# Patient Record
Sex: FEMALE
[De-identification: names, ages, dates, MRNs, and addresses within clinical notes are randomized per-mention and may not be internally consistent; named-entity substitution may affect disease eponyms.]

---

## 2017-12-29 ENCOUNTER — HOSPITAL ENCOUNTER (OUTPATIENT)
Dept: HOSPITAL 95 - LAB SHORT | Age: 62
End: 2017-12-29
Payer: OTHER GOVERNMENT

## 2017-12-29 DIAGNOSIS — R07.0: Primary | ICD-10-CM

## 2020-07-14 ENCOUNTER — HOSPITAL ENCOUNTER (OUTPATIENT)
Dept: HOSPITAL 95 - ORSCMMR | Age: 65
LOS: 1 days | Discharge: HOME | End: 2020-07-15
Attending: ORTHOPAEDIC SURGERY
Payer: OTHER GOVERNMENT

## 2020-07-14 VITALS — BODY MASS INDEX: 34.15 KG/M2 | WEIGHT: 217.6 LBS | HEIGHT: 67.01 IN

## 2020-07-14 DIAGNOSIS — M16.12: Primary | ICD-10-CM

## 2020-07-14 DIAGNOSIS — E66.9: ICD-10-CM

## 2020-07-14 PROCEDURE — C1776 JOINT DEVICE (IMPLANTABLE): HCPCS

## 2020-07-14 PROCEDURE — 0SRB0JA REPLACEMENT OF LEFT HIP JOINT WITH SYNTHETIC SUBSTITUTE, UNCEMENTED, OPEN APPROACH: ICD-10-PCS | Performed by: ORTHOPAEDIC SURGERY

## 2020-07-14 NOTE — NUR
History, Chart, Medications and Allergies reviewed before start of
procedure. Patient confirms NPO status and agrees with scheduled surgery.
Patient reports completing Chlorhexadine shower X2 prior to admission to
hospital.

## 2020-07-14 NOTE — NUR
1630-
TIME OUT FOR SPINAL ANESTHESIA BY DR ROBERTS AT [facility] BEDSIDE. 2L O2/NC INTACT.
1632-
SPINAL ANESTHESIA COMPLETE. PULSE OX INTACT. DR ROBERTS STAYING AT BEDSIDE TO
MONITOR PATIENT.

## 2020-07-14 NOTE — NUR
PATIENT ARRIVED @1935 TO ROOM 222. PATIENT IS AWAKE AND AWARE, MOTHER IS
PRESENT WITH PATIENT. VITAL SIGNS ARE STABLE, HR HAS BEEN 48 IN PACU AND IS
STILL 48. nO CURRENT COMPLAINTS OF PAIN. PT HAD A SPINAL AND INITIALLY HAD
FEELING TO L1-L2. sHE CURRENTLY HAS FULL FEELING OVER ALL DREMATOMES AND IS
ABLE TO MOVE BOTH LEGS AND CAN DISERN PRESSURE AND LIGHT TOUCH ON BOTH LEGS
ANT AND POST. PAS AND JOSE HOSE ON BOTH LEs. PATIENT HAS BEEN RESTING WELL AND
CURRENTLY HAS 1/10 PAIN IN HER LT HIP. CALL LIGHT IN REACH. ABLE TO START WITH
CLEAR LIQUIDS AND DID WELL WITH THAT.

## 2020-07-15 LAB
ANION GAP SERPL CALCULATED.4IONS-SCNC: 4 MMOL/L (ref 6–16)
BASOPHILS # BLD AUTO: 0.01 K/MM3 (ref 0–0.23)
BASOPHILS NFR BLD AUTO: 0 % (ref 0–2)
BUN SERPL-MCNC: 15 MG/DL (ref 8–24)
CALCIUM SERPL-MCNC: 8.8 MG/DL (ref 8.5–10.1)
CHLORIDE SERPL-SCNC: 108 MMOL/L (ref 98–108)
CO2 SERPL-SCNC: 27 MMOL/L (ref 21–32)
CREAT SERPL-MCNC: 0.64 MG/DL (ref 0.4–1)
DEPRECATED RDW RBC AUTO: 37.6 FL (ref 35.1–46.3)
EOSINOPHIL # BLD AUTO: 0 K/MM3 (ref 0–0.68)
EOSINOPHIL NFR BLD AUTO: 0 % (ref 0–6)
ERYTHROCYTE [DISTWIDTH] IN BLOOD BY AUTOMATED COUNT: 11.7 % (ref 11.7–14.2)
GLUCOSE SERPL-MCNC: 137 MG/DL (ref 70–99)
HCT VFR BLD AUTO: 34.5 % (ref 33–51)
HGB BLD-MCNC: 11.4 G/DL (ref 11.5–16)
IMM GRANULOCYTES # BLD AUTO: 0.05 K/MM3 (ref 0–0.1)
IMM GRANULOCYTES NFR BLD AUTO: 1 % (ref 0–1)
LYMPHOCYTES # BLD AUTO: 0.72 K/MM3 (ref 0.84–5.2)
LYMPHOCYTES NFR BLD AUTO: 7 % (ref 21–46)
MCHC RBC AUTO-ENTMCNC: 33 G/DL (ref 31.5–36.5)
MCV RBC AUTO: 88 FL (ref 80–100)
MONOCYTES # BLD AUTO: 0.24 K/MM3 (ref 0.16–1.47)
MONOCYTES NFR BLD AUTO: 2 % (ref 4–13)
NEUTROPHILS # BLD AUTO: 8.96 K/MM3 (ref 1.96–9.15)
NEUTROPHILS NFR BLD AUTO: 90 % (ref 41–73)
NRBC # BLD AUTO: 0 K/MM3 (ref 0–0.02)
NRBC BLD AUTO-RTO: 0 /100 WBC (ref 0–0.2)
PLATELET # BLD AUTO: 202 K/MM3 (ref 150–400)
POTASSIUM SERPL-SCNC: 4.3 MMOL/L (ref 3.5–5.5)
SODIUM SERPL-SCNC: 139 MMOL/L (ref 136–145)

## 2020-07-15 NOTE — NUR
SHIFT SUMMARY
PATIENT HAS HAD GOOD PAIN CONTROL WITH SCHEDULED MEDICATIONS. SHE WAS UP TO BR
WITH ASSISTANCE AND WALKER 3 TIMES AND DID VERY WELL. SHE STATES THAT HER PAIN
NOW IS MUCH BETTER WITH THE NEW HIP. HER DRESSINGS ON RT AND LT ANT HIPS ARE
CLEAN AND DRY. SHE HAS A SLIGHT BRUISING ON THE LT HIP LATERAL ASPECT OF THE
DRESSING. SHE IS CURRENTLY UP IN THE CHAIR WITH POLAR PACK ON LT HIP. SHE IS
ANTICIPATING DISCHARGE AND IS CHANGED INTO HER OWN CLOTHS. NO OTHER CHANGES.

## 2020-07-15 NOTE — NUR
DISCHARGE
PT GIVEN WRITTEN AND VERBAL DISCHARGE INSTRUCTIONS AND VERBALIZED
UNDERSTANDING OF THESE INSTRUCTIONS. IV REMOVED, PT TOLERATED WELL. PT WILL
CALL FOR ASSISTANCE TO CAR WHEN HER RIDE ARRIVES.

## 2021-12-02 ENCOUNTER — HOSPITAL ENCOUNTER (OUTPATIENT)
Dept: HOSPITAL 95 - LAB SHORT | Age: 66
End: 2021-12-02
Attending: FAMILY MEDICINE
Payer: OTHER GOVERNMENT

## 2021-12-02 DIAGNOSIS — Z01.419: Primary | ICD-10-CM

## 2021-12-02 PROCEDURE — G0123 SCREEN CERV/VAG THIN LAYER: HCPCS

## 2021-12-03 LAB — OTHER STN SPEC: (no result)

## 2022-12-14 ENCOUNTER — HOSPITAL ENCOUNTER (OUTPATIENT)
Dept: HOSPITAL 95 - ORSCSDS | Age: 67
Discharge: HOME | End: 2022-12-14
Attending: INTERNAL MEDICINE
Payer: MEDICARE

## 2022-12-14 VITALS — BODY MASS INDEX: 34.43 KG/M2 | WEIGHT: 219.36 LBS | HEIGHT: 67 IN

## 2022-12-14 DIAGNOSIS — K57.30: ICD-10-CM

## 2022-12-14 DIAGNOSIS — Z86.010: ICD-10-CM

## 2022-12-14 DIAGNOSIS — R19.5: Primary | ICD-10-CM

## 2022-12-14 DIAGNOSIS — K64.8: ICD-10-CM

## 2022-12-14 DIAGNOSIS — E66.9: ICD-10-CM

## 2022-12-14 PROCEDURE — 0DJD8ZZ INSPECTION OF LOWER INTESTINAL TRACT, VIA NATURAL OR ARTIFICIAL OPENING ENDOSCOPIC: ICD-10-PCS | Performed by: INTERNAL MEDICINE

## 2023-04-25 ENCOUNTER — HOSPITAL ENCOUNTER (OUTPATIENT)
Dept: HOSPITAL 95 - LAB SHORT | Age: 68
Discharge: HOME | End: 2023-04-25
Attending: FAMILY MEDICINE
Payer: MEDICARE

## 2023-04-25 DIAGNOSIS — N39.0: Primary | ICD-10-CM

## 2023-05-09 ENCOUNTER — HOSPITAL ENCOUNTER (OUTPATIENT)
Dept: HOSPITAL 95 - ORSCMMR | Age: 68
LOS: 1 days | Discharge: HOME | End: 2023-05-10
Attending: ORTHOPAEDIC SURGERY
Payer: MEDICARE

## 2023-05-09 VITALS — SYSTOLIC BLOOD PRESSURE: 111 MMHG | DIASTOLIC BLOOD PRESSURE: 59 MMHG

## 2023-05-09 VITALS — SYSTOLIC BLOOD PRESSURE: 109 MMHG | DIASTOLIC BLOOD PRESSURE: 61 MMHG

## 2023-05-09 VITALS — DIASTOLIC BLOOD PRESSURE: 57 MMHG | SYSTOLIC BLOOD PRESSURE: 119 MMHG

## 2023-05-09 VITALS — DIASTOLIC BLOOD PRESSURE: 58 MMHG | SYSTOLIC BLOOD PRESSURE: 124 MMHG

## 2023-05-09 VITALS — SYSTOLIC BLOOD PRESSURE: 124 MMHG | DIASTOLIC BLOOD PRESSURE: 77 MMHG

## 2023-05-09 VITALS — SYSTOLIC BLOOD PRESSURE: 112 MMHG | DIASTOLIC BLOOD PRESSURE: 63 MMHG

## 2023-05-09 VITALS — DIASTOLIC BLOOD PRESSURE: 71 MMHG | SYSTOLIC BLOOD PRESSURE: 129 MMHG

## 2023-05-09 VITALS — DIASTOLIC BLOOD PRESSURE: 63 MMHG | SYSTOLIC BLOOD PRESSURE: 114 MMHG

## 2023-05-09 VITALS — DIASTOLIC BLOOD PRESSURE: 62 MMHG | SYSTOLIC BLOOD PRESSURE: 103 MMHG

## 2023-05-09 VITALS — SYSTOLIC BLOOD PRESSURE: 148 MMHG | DIASTOLIC BLOOD PRESSURE: 76 MMHG

## 2023-05-09 VITALS — DIASTOLIC BLOOD PRESSURE: 60 MMHG | SYSTOLIC BLOOD PRESSURE: 112 MMHG

## 2023-05-09 VITALS — DIASTOLIC BLOOD PRESSURE: 59 MMHG | SYSTOLIC BLOOD PRESSURE: 114 MMHG

## 2023-05-09 VITALS — DIASTOLIC BLOOD PRESSURE: 59 MMHG | SYSTOLIC BLOOD PRESSURE: 108 MMHG

## 2023-05-09 VITALS — BODY MASS INDEX: 34.64 KG/M2 | WEIGHT: 220.68 LBS | HEIGHT: 67 IN

## 2023-05-09 VITALS — DIASTOLIC BLOOD PRESSURE: 62 MMHG | SYSTOLIC BLOOD PRESSURE: 114 MMHG

## 2023-05-09 DIAGNOSIS — E66.9: ICD-10-CM

## 2023-05-09 DIAGNOSIS — M16.11: Primary | ICD-10-CM

## 2023-05-09 DIAGNOSIS — Z96.642: ICD-10-CM

## 2023-05-09 PROCEDURE — A9270 NON-COVERED ITEM OR SERVICE: HCPCS

## 2023-05-09 PROCEDURE — 0SR90JZ REPLACEMENT OF RIGHT HIP JOINT WITH SYNTHETIC SUBSTITUTE, OPEN APPROACH: ICD-10-PCS | Performed by: ORTHOPAEDIC SURGERY

## 2023-05-09 PROCEDURE — 27130 TOTAL HIP ARTHROPLASTY: CPT

## 2023-05-09 PROCEDURE — 0055T BONE SRGRY CMPTR CT/MRI IMAG: CPT

## 2023-05-09 PROCEDURE — C1776 JOINT DEVICE (IMPLANTABLE): HCPCS

## 2023-05-09 NOTE — NUR
Spiritual Care | Patient Request
Pt. is awake in bed and welcomes my visit. Pts. mother is present.  Pt. is
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 Matters of maldonado,
belief as it intersects with the Pts. life review are considered. Rapport is
established and I prayed with the women. Pt. displayed evidence of an uplifted
spirit.  Both verbalized gratitude for the spiritual care visit.

## 2023-05-09 NOTE — NUR
SHIFT SUMMARY
NEW ADMIT TO UNIT FROM PACU POD 0 R ISAMAR. ALERT AND ORIENTED. SPINAL IN EFFECT
FOR SEVERAL HOURS. LATE IN AFTERNOON PT WORKED WITH PATIENT. AMBULATED IN ROOM
WITH FWW AND GB, UP TO RECLINER. RIGHT HIP WITH AQUACEL C/D/I. POLAR PACK IN
PLACE. TOLERATING REGULAR DIET AND LIQUIDS. SALINE LOCKED. ONE EPISODE OF
NAUSEA AND VOMITING, PASSED QUICKLY AND DECLINED ANTI-NAUSEA MEDICINE. NO VOID
AT THIS TIME, WILL CONTINUE TO MONITOR WITH BS AND CATH AS NEEDED.

## 2023-05-10 VITALS — SYSTOLIC BLOOD PRESSURE: 127 MMHG | DIASTOLIC BLOOD PRESSURE: 63 MMHG

## 2023-05-10 VITALS — DIASTOLIC BLOOD PRESSURE: 54 MMHG | SYSTOLIC BLOOD PRESSURE: 112 MMHG

## 2023-05-10 LAB
ANION GAP SERPL CALCULATED.4IONS-SCNC: 2 MMOL/L (ref 6–16)
BASOPHILS # BLD AUTO: 0.01 K/MM3 (ref 0–0.23)
BASOPHILS NFR BLD AUTO: 0 % (ref 0–2)
BUN SERPL-MCNC: 18 MG/DL (ref 8–24)
CALCIUM SERPL-MCNC: 8.3 MG/DL (ref 8.5–10.1)
CHLORIDE SERPL-SCNC: 110 MMOL/L (ref 98–108)
CO2 SERPL-SCNC: 29 MMOL/L (ref 21–32)
CREAT SERPL-MCNC: 0.66 MG/DL (ref 0.4–1)
DEPRECATED RDW RBC AUTO: 39.8 FL (ref 35.1–46.3)
EOSINOPHIL # BLD AUTO: 0.02 K/MM3 (ref 0–0.68)
EOSINOPHIL NFR BLD AUTO: 0 % (ref 0–6)
ERYTHROCYTE [DISTWIDTH] IN BLOOD BY AUTOMATED COUNT: 12.8 % (ref 11.7–14.2)
GLUCOSE SERPL-MCNC: 113 MG/DL (ref 70–99)
HCT VFR BLD AUTO: 32.2 % (ref 33–51)
HGB BLD-MCNC: 10.9 G/DL (ref 11.5–16)
IMM GRANULOCYTES # BLD AUTO: 0.02 K/MM3 (ref 0–0.1)
IMM GRANULOCYTES NFR BLD AUTO: 0 % (ref 0–1)
LYMPHOCYTES # BLD AUTO: 1.4 K/MM3 (ref 0.84–5.2)
LYMPHOCYTES NFR BLD AUTO: 19 % (ref 21–46)
MCHC RBC AUTO-ENTMCNC: 33.9 G/DL (ref 31.5–36.5)
MCV RBC AUTO: 86 FL (ref 80–100)
MONOCYTES # BLD AUTO: 0.47 K/MM3 (ref 0.16–1.47)
MONOCYTES NFR BLD AUTO: 6 % (ref 4–13)
NEUTROPHILS # BLD AUTO: 5.64 K/MM3 (ref 1.96–9.15)
NEUTROPHILS NFR BLD AUTO: 75 % (ref 41–73)
NRBC # BLD AUTO: 0 K/MM3 (ref 0–0.02)
NRBC BLD AUTO-RTO: 0 /100 WBC (ref 0–0.2)
PLATELET # BLD AUTO: 177 K/MM3 (ref 150–400)
POTASSIUM SERPL-SCNC: 3.8 MMOL/L (ref 3.5–5.5)
SODIUM SERPL-SCNC: 141 MMOL/L (ref 136–145)

## 2023-05-10 NOTE — NUR
DISCHARGE NOTE:
PATIENT WAS EDUCATED ON DISCHARGE INSTRUCTIONS. SHE VERBALIZED UNDERSTANDING
OF INSTRUCTIONS AND HAD NO FURTHER QUESTIONS AT THIS TIME. PAIN IS MANAGED
WITH PO PAIN MEDICATIONS. HARD PERSCRIPTIONS ARE IN HER INSTRUCTIONS FOLDER.
SHE HAS AN AQUACEL ON THE RIGHT HIP THAT IS C/D/I. DENIES NUMBNESS OR
TINGLING. SHE IS A SBA WITH FWW AND GAIT BELT. PATIENT IS DRESSED AND HAS
PERSONAL ITEMS IN THE ROOM GATHERED. SHE IS TOLERATING PO INTAKE AND IS
VOIDING. AWAITING FOR HER RIDE TO COME PICK HER UP TO TAKE HER HOME.

## 2023-05-10 NOTE — NUR
SHIFT SUMMARY
POD1 RIGHT ISAMAR. AQUACEL IS C/D/I. CIRCULATION AND SENSATION IS INTACT. VSS. PT
SLEPT WELL T/O THE NIGHT. MEDICATED WITH PRNS FOR PAIN AND ONCE WITH OXY. PT
HAS BEEN AMBULATING, TOLLERATING PO INTAKE, AND VOIDING W/O DIFFICULTY. NO
ACUTE EVENTS T/O THE NIGHT. NO ACUTE EVENTS NOTED. PLAN FOR PT TO WORK WITH PT
AND D/C HOME TODAY. THE PATIENT IS CURRENTLY SLEEPING, IN NO DISTRESS, CALL
LIGHT IN REACH